# Patient Record
Sex: FEMALE | Race: WHITE | NOT HISPANIC OR LATINO | Employment: OTHER | URBAN - METROPOLITAN AREA
[De-identification: names, ages, dates, MRNs, and addresses within clinical notes are randomized per-mention and may not be internally consistent; named-entity substitution may affect disease eponyms.]

---

## 2018-04-04 ENCOUNTER — HISTORICAL (OUTPATIENT)
Dept: RADIOLOGY | Facility: HOSPITAL | Age: 78
End: 2018-04-04

## 2018-04-04 LAB — POC CREATININE: 0.5 MG/DL (ref 0.6–1.3)

## 2021-12-21 ENCOUNTER — HISTORICAL (OUTPATIENT)
Dept: RADIOLOGY | Facility: HOSPITAL | Age: 81
End: 2021-12-21

## 2021-12-21 LAB — POC CREATININE: 0.7 MG/DL (ref 0.6–1.3)

## 2022-09-08 RX ORDER — PREGABALIN 75 MG/1
75 CAPSULE ORAL DAILY
Qty: 30 CAPSULE | Refills: 1 | OUTPATIENT
Start: 2022-09-08

## 2022-09-08 RX ORDER — PREGABALIN 75 MG/1
75 CAPSULE ORAL
COMMUNITY
Start: 2021-11-02

## 2022-09-19 ENCOUNTER — OFFICE VISIT (OUTPATIENT)
Dept: HEMATOLOGY/ONCOLOGY | Facility: CLINIC | Age: 82
End: 2022-09-19
Payer: COMMERCIAL

## 2022-09-19 VITALS
SYSTOLIC BLOOD PRESSURE: 116 MMHG | OXYGEN SATURATION: 98 % | TEMPERATURE: 98 F | BODY MASS INDEX: 28.87 KG/M2 | RESPIRATION RATE: 18 BRPM | WEIGHT: 152.88 LBS | DIASTOLIC BLOOD PRESSURE: 65 MMHG | HEIGHT: 61 IN | HEART RATE: 91 BPM

## 2022-09-19 DIAGNOSIS — Z17.0 MALIGNANT NEOPLASM OF RIGHT BREAST IN FEMALE, ESTROGEN RECEPTOR POSITIVE, UNSPECIFIED SITE OF BREAST: Primary | ICD-10-CM

## 2022-09-19 DIAGNOSIS — C50.911 MALIGNANT NEOPLASM OF RIGHT BREAST IN FEMALE, ESTROGEN RECEPTOR POSITIVE, UNSPECIFIED SITE OF BREAST: Primary | ICD-10-CM

## 2022-09-19 PROCEDURE — 3078F PR MOST RECENT DIASTOLIC BLOOD PRESSURE < 80 MM HG: ICD-10-PCS | Mod: CPTII,,, | Performed by: INTERNAL MEDICINE

## 2022-09-19 PROCEDURE — 1159F PR MEDICATION LIST DOCUMENTED IN MEDICAL RECORD: ICD-10-PCS | Mod: CPTII,,, | Performed by: INTERNAL MEDICINE

## 2022-09-19 PROCEDURE — 3078F DIAST BP <80 MM HG: CPT | Mod: CPTII,,, | Performed by: INTERNAL MEDICINE

## 2022-09-19 PROCEDURE — 1101F PT FALLS ASSESS-DOCD LE1/YR: CPT | Mod: CPTII,,, | Performed by: INTERNAL MEDICINE

## 2022-09-19 PROCEDURE — 1125F AMNT PAIN NOTED PAIN PRSNT: CPT | Mod: CPTII,,, | Performed by: INTERNAL MEDICINE

## 2022-09-19 PROCEDURE — 3074F PR MOST RECENT SYSTOLIC BLOOD PRESSURE < 130 MM HG: ICD-10-PCS | Mod: CPTII,,, | Performed by: INTERNAL MEDICINE

## 2022-09-19 PROCEDURE — 99214 OFFICE O/P EST MOD 30 MIN: CPT | Mod: ,,, | Performed by: INTERNAL MEDICINE

## 2022-09-19 PROCEDURE — 3288F PR FALLS RISK ASSESSMENT DOCUMENTED: ICD-10-PCS | Mod: CPTII,,, | Performed by: INTERNAL MEDICINE

## 2022-09-19 PROCEDURE — 99214 PR OFFICE/OUTPT VISIT, EST, LEVL IV, 30-39 MIN: ICD-10-PCS | Mod: ,,, | Performed by: INTERNAL MEDICINE

## 2022-09-19 PROCEDURE — 1101F PR PT FALLS ASSESS DOC 0-1 FALLS W/OUT INJ PAST YR: ICD-10-PCS | Mod: CPTII,,, | Performed by: INTERNAL MEDICINE

## 2022-09-19 PROCEDURE — 1125F PR PAIN SEVERITY QUANTIFIED, PAIN PRESENT: ICD-10-PCS | Mod: CPTII,,, | Performed by: INTERNAL MEDICINE

## 2022-09-19 PROCEDURE — 1159F MED LIST DOCD IN RCRD: CPT | Mod: CPTII,,, | Performed by: INTERNAL MEDICINE

## 2022-09-19 PROCEDURE — 3288F FALL RISK ASSESSMENT DOCD: CPT | Mod: CPTII,,, | Performed by: INTERNAL MEDICINE

## 2022-09-19 PROCEDURE — 3074F SYST BP LT 130 MM HG: CPT | Mod: CPTII,,, | Performed by: INTERNAL MEDICINE

## 2022-09-19 RX ORDER — EXEMESTANE 25 MG/1
25 TABLET ORAL DAILY
Qty: 90 TABLET | Refills: 3 | Status: SHIPPED | OUTPATIENT
Start: 2022-09-19 | End: 2023-10-02

## 2022-09-19 RX ORDER — ROSUVASTATIN CALCIUM 20 MG/1
20 TABLET, COATED ORAL DAILY
COMMUNITY

## 2022-09-19 RX ORDER — FLUTICASONE PROPIONATE AND SALMETEROL 100; 50 UG/1; UG/1
1 POWDER RESPIRATORY (INHALATION) 2 TIMES DAILY
COMMUNITY

## 2022-09-19 RX ORDER — CARVEDILOL 12.5 MG/1
12.5 TABLET ORAL 2 TIMES DAILY WITH MEALS
COMMUNITY

## 2022-09-19 RX ORDER — ONDANSETRON HYDROCHLORIDE 8 MG/1
TABLET, FILM COATED ORAL EVERY 8 HOURS PRN
COMMUNITY

## 2022-09-19 RX ORDER — NITROGLYCERIN 80 MG/1
1 PATCH TRANSDERMAL
COMMUNITY

## 2022-09-19 RX ORDER — FEXOFENADINE HCL AND PSEUDOEPHEDRINE HCI 180; 240 MG/1; MG/1
1 TABLET, EXTENDED RELEASE ORAL DAILY
COMMUNITY

## 2022-09-19 RX ORDER — FUROSEMIDE 20 MG/1
20 TABLET ORAL 2 TIMES DAILY
COMMUNITY

## 2022-09-19 RX ORDER — OMEPRAZOLE 40 MG/1
40 CAPSULE, DELAYED RELEASE ORAL DAILY
COMMUNITY

## 2022-09-19 RX ORDER — DEXTROMETHORPHAN HYDROBROMIDE, GUAIFENESIN 5; 100 MG/5ML; MG/5ML
650 LIQUID ORAL EVERY 8 HOURS
COMMUNITY

## 2022-09-19 RX ORDER — ASPIRIN 81 MG/1
81 TABLET ORAL DAILY
COMMUNITY

## 2022-09-19 RX ORDER — EXEMESTANE 25 MG/1
25 TABLET ORAL DAILY
COMMUNITY
End: 2023-10-02

## 2022-09-19 RX ORDER — RANOLAZINE 500 MG/1
500 TABLET, EXTENDED RELEASE ORAL 2 TIMES DAILY
COMMUNITY
Start: 2022-09-09

## 2022-09-19 NOTE — PROGRESS NOTES
DATE:  09/19/2022    PROBLEM:  Stage IIIC ER 95%, VT 98% and HER 2 negative UOQ right breast with positive axillary LN on biopsy. Received neoadjuvant chemotherapy. Post mastectomy stage [id number] with 19 of 20 + LN and a 4.5 cm primary..     HxPI:  83 yo female who went for a routine mammogram that was abnormal and they were subsequently able to palpate a mass in her breast.   Prior mammogram was 6/14/2016 read as benign.  11/29/17 Breast U/S revealed a 4.2 x 2.3 cm mass at 10:00.  A 12/22/17 core biopsy revealed invasive ductal carcinoma with lobular features, grade 2, No LVI and focal DCIS, ER 95%, VT 98% and HER 2 0; Ki67=7%  MRI breast center [facility] 12/28/17 revealed right breast 9:00 mass 4.1 x 1.5, 2.4 cm. There are numerous enlarged LN in the right axilla extending into the right axillary tail inferiorly and extending to the pectoralis minor muscle posteriorly and superiorly; highly suspicious.   On 1/11/18 she had a Mediport placed and biopsy of axillary LN that was negative.  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 CT abdomen no mets;  She initiated neoadjuvant ddAC x 4 followed by dd Taxol x 4 from 1/30/18 to 5/15/18. At mastectomy [id number]: she had a residual primary measuring 4.5 cm with 19 of 20 + LN with no treatment effect.   She started radiation 8/8/18 completed 9/19/18. She has been on arimidex since 10/2018. She has been on Prolia since 11/2018 received 2nd dose 5/2019 and then had interruption until 9/202 when she resumed  9/9/20 she was changed from arimidex to femara due to severe arthritic pains. Femara stopped 3/2021 due to increased arthralgias.  Current Treatment: observation    Treatment History:   neoadjuvant dd AC x 4 followed by ddTaxol x 4 cycles 1/30/18 to 3/15/18; dd Taxol initiated 4/3/18 to 5/15/18  Mastectomy 6/25/18  EBRT 8/8/18- 9/19/18  Arimidex 10/20/18- 9/9/20  (stopped due to increased arthralgias)  Prolia q 6 months (started 2018)  Femara 20- 3/15/21 (currently on hold due to arthralgias.    INTERVAL Hx::  2022    LABS:    3/21/22 CBC and CMP WNL    IMAGIN18 MRI: axillary adenopathy decreased from 2.1 to 1.2 cm; breast mass decreased from 4.1 to 3.5 cm  18 CT chest: resolved tiny LLL nodule; stable RML density (patient attributes to previous PE)  10/8/18: DEXA- osteoporosis in and right femoral head; osteopenia left femoral head  10/16/18 CT c/a/p- stable 2mm LLL nodule, post mastectomy fluid collection RCW  11/15/18: MMG left best- benign  19 CT Chest parenchymal opacity to right apex and peripheral right middle lobe, these areas do not have masslike appearance and are attributed to post radiation changes; left lower lobe nodule remains stable; right sided post operative seroma has increased in size  19 CT chest: decreased seroma, LLL nodule stable, right apical opacity stable  20 MMG screening left: no evidence of malignancy  20 US right breast fluid collection 8,5x2,6x0,4cm suggestive of seroma or lymphocele. Complex mass including prominent complex cyst with irregular wall thickness anterior aspect of residual right breast tissue  10/14/20 DEXA: T -.2.6 L spine, -2.7 left femur, -3.2 right femur  21 MMG screening: BIRADS2 benign   21MRI brain w/ and w/o contrast: Evolving right occipital hemorrhage has decreased in size with less edema. Consider follow-up brain MRI with and without contrast in 3-6 months to document expected evolutionary changes/resolution of enhancement, unless clinically warranted sooner  22 MMG screening: BIRADS2     ROS:  CONSTITUTIONAL: no fevers, no chills, no weight loss, no fatigue, no weakness  HEMATOLOGIC: no abnormal bleeding, no abnormal bruising, no drenching night sweats  ONCOLOGIC: no new masses or lumps  HEENT: no vision loss, no tinnitus or hearing loss, no nose bleeding, no  "dysphagia, no odynophagia  CVS: no chest pain, no palpitations, no dyspnea on exertion  RESP: no shortness of breath, no hemoptysis, no cough  BREAST: no nipple discharge, no breast tenderness, no breast masses on self breast examination, s/p right mastectomy  GI: no nausea, no vomiting, no diarrhea, no constipation, no melena, no hematochezia, no hematemesis, no abdominal pain, no increase in abdominal girth  : no dysuria, no hematuria, no discharge  GYN: no abnormal vaginal bleeding, no dyspareunia, no vaginal discharge  INTEGUMENT: no rashes, no abnormal bruising, no nail pitting, no hyperpigmentation  NEURO: no falls, no memory loss, + neuropathy LLE (chronic), no urofecal incontinence or retention, no loss of strength on any extremity  MSK: no back pain, no joint pain, no joint swelling  PSYCH: no suicidal or homicidal ideation, no depression, no insomnia, no anhedonia  ENDOCRINE: no heat or cold intolerance, no polyuria, no polydipsia     OBSERVATIONS:  SKIN:norashes,nobruisesorpurpura,warm/dry  NEURO:normalmentation,strength5/2cupsy6uicdokbxmlx,nosensorydeficits" dd:footnoteid="_[id number]"GA: AAOx3, NAD  HEENT: NCAT, PERRLA, EOMI, no oral ulcers  LYMPH: no cervical, axillary, or supraclavicular adenopathy  CVS: s1s2 RRR, no M/R/G  RESP: CTA b/l, no crackles, no wheezes or rhonchi  Breast: Left: no masses, lumps, no nipple inversion, no peau d'orange, Right: fibrotic scar tissue around mastectomy site  ABD: Soft, NT, ND, BS+, no hepatosplenomegaly  EXT: POPE no deformities, no pedal edema but asymmetry with LLE calf diameter > RLE  SKIN: no rashes, no bruises or purpura, warm and dry  NEURO: normal mentation, strength 5/5 on all 4 extremities, no sensory deficits.     ASSESSMENT:  1. Malignant neoplasm of upper-outer quadrant of right female breast C50.411 stage IIIC RUQ IDC grade 2 HR+/HER2- s/p NA chemo with minimal to no pathologic response, s/p right mastectomy 6/2018. Completed " EBRT 9/19/18. Arimidex initiated 10/20/18 and stopped 9/9/20 due to severe arthralgias  On Femara since 9/9/20 with same symptoms causing limitations in ADLs and quality of life, Femara has been on hold since 3/2021.Now on aromasin.  2. Osteoporosis M81.0 DEXA 10/8/18 revealed osteoporosis. Repeat DEXA 10/2020 showed multifocal osteoporosis as well. Next DEXA due 10/2022  She received 2 doses of prolia 11/2018 and 5/2019 then had interruption until 9/2020 at which point Prolia resumed.  c/w q6 months Prolia. Last dose 9/28/21  Continue Vitamin D + Calcium for bone health.    PLAN:  Continue antihormone treatment in the form of Examestane (Aromasin) until the 5 year marisa.  Needs refill.  Due for Prolia.  With change in insurance, we will have the put in another request.  Left breast mammography will be due in January.  Next follow-up recheck in 6 months.  CBC and CMP on RTC.    JEFF WHEELER M.D., FACP

## 2022-10-12 DIAGNOSIS — M81.0 OSTEOPOROSIS, UNSPECIFIED OSTEOPOROSIS TYPE, UNSPECIFIED PATHOLOGICAL FRACTURE PRESENCE: ICD-10-CM

## 2023-03-20 ENCOUNTER — OFFICE VISIT (OUTPATIENT)
Dept: HEMATOLOGY/ONCOLOGY | Facility: CLINIC | Age: 83
End: 2023-03-20
Payer: MEDICARE

## 2023-03-20 VITALS
HEIGHT: 61 IN | OXYGEN SATURATION: 96 % | BODY MASS INDEX: 28.55 KG/M2 | RESPIRATION RATE: 20 BRPM | DIASTOLIC BLOOD PRESSURE: 92 MMHG | HEART RATE: 74 BPM | TEMPERATURE: 98 F | SYSTOLIC BLOOD PRESSURE: 146 MMHG | WEIGHT: 151.19 LBS

## 2023-03-20 DIAGNOSIS — M81.0 OSTEOPOROSIS, UNSPECIFIED OSTEOPOROSIS TYPE, UNSPECIFIED PATHOLOGICAL FRACTURE PRESENCE: ICD-10-CM

## 2023-03-20 DIAGNOSIS — Z17.0 MALIGNANT NEOPLASM OF RIGHT BREAST IN FEMALE, ESTROGEN RECEPTOR POSITIVE, UNSPECIFIED SITE OF BREAST: Primary | ICD-10-CM

## 2023-03-20 DIAGNOSIS — C50.911 MALIGNANT NEOPLASM OF RIGHT BREAST IN FEMALE, ESTROGEN RECEPTOR POSITIVE, UNSPECIFIED SITE OF BREAST: Primary | ICD-10-CM

## 2023-03-20 PROCEDURE — 3080F DIAST BP >= 90 MM HG: CPT | Mod: CPTII,,, | Performed by: NURSE PRACTITIONER

## 2023-03-20 PROCEDURE — 3288F FALL RISK ASSESSMENT DOCD: CPT | Mod: CPTII,,, | Performed by: NURSE PRACTITIONER

## 2023-03-20 PROCEDURE — 1125F AMNT PAIN NOTED PAIN PRSNT: CPT | Mod: CPTII,,, | Performed by: NURSE PRACTITIONER

## 2023-03-20 PROCEDURE — 3288F PR FALLS RISK ASSESSMENT DOCUMENTED: ICD-10-PCS | Mod: CPTII,,, | Performed by: NURSE PRACTITIONER

## 2023-03-20 PROCEDURE — 1159F MED LIST DOCD IN RCRD: CPT | Mod: CPTII,,, | Performed by: NURSE PRACTITIONER

## 2023-03-20 PROCEDURE — 1101F PT FALLS ASSESS-DOCD LE1/YR: CPT | Mod: CPTII,,, | Performed by: NURSE PRACTITIONER

## 2023-03-20 PROCEDURE — 1160F PR REVIEW ALL MEDS BY PRESCRIBER/CLIN PHARMACIST DOCUMENTED: ICD-10-PCS | Mod: CPTII,,, | Performed by: NURSE PRACTITIONER

## 2023-03-20 PROCEDURE — 1125F PR PAIN SEVERITY QUANTIFIED, PAIN PRESENT: ICD-10-PCS | Mod: CPTII,,, | Performed by: NURSE PRACTITIONER

## 2023-03-20 PROCEDURE — 3077F SYST BP >= 140 MM HG: CPT | Mod: CPTII,,, | Performed by: NURSE PRACTITIONER

## 2023-03-20 PROCEDURE — 1101F PR PT FALLS ASSESS DOC 0-1 FALLS W/OUT INJ PAST YR: ICD-10-PCS | Mod: CPTII,,, | Performed by: NURSE PRACTITIONER

## 2023-03-20 PROCEDURE — 1159F PR MEDICATION LIST DOCUMENTED IN MEDICAL RECORD: ICD-10-PCS | Mod: CPTII,,, | Performed by: NURSE PRACTITIONER

## 2023-03-20 PROCEDURE — 3080F PR MOST RECENT DIASTOLIC BLOOD PRESSURE >= 90 MM HG: ICD-10-PCS | Mod: CPTII,,, | Performed by: NURSE PRACTITIONER

## 2023-03-20 PROCEDURE — 3077F PR MOST RECENT SYSTOLIC BLOOD PRESSURE >= 140 MM HG: ICD-10-PCS | Mod: CPTII,,, | Performed by: NURSE PRACTITIONER

## 2023-03-20 PROCEDURE — 99214 PR OFFICE/OUTPT VISIT, EST, LEVL IV, 30-39 MIN: ICD-10-PCS | Mod: ,,, | Performed by: NURSE PRACTITIONER

## 2023-03-20 PROCEDURE — 1160F RVW MEDS BY RX/DR IN RCRD: CPT | Mod: CPTII,,, | Performed by: NURSE PRACTITIONER

## 2023-03-20 PROCEDURE — 99214 OFFICE O/P EST MOD 30 MIN: CPT | Mod: ,,, | Performed by: NURSE PRACTITIONER

## 2023-03-20 NOTE — PROGRESS NOTES
DATE:  03/20/2023    PROBLEM:  Stage IIIC ER 95%, LA 98% and HER 2 negative UOQ right breast with positive axillary LN on biopsy. Received neoadjuvant chemotherapy. Post mastectomy stage [id number] with 19 of 20 + LN and a 4.5 cm primary..     HxPI:  83 yo female who went for a routine mammogram that was abnormal and they were subsequently able to palpate a mass in her breast.   Prior mammogram was 6/14/2016 read as benign.  11/29/17 Breast U/S revealed a 4.2 x 2.3 cm mass at 10:00.  A 12/22/17 core biopsy revealed invasive ductal carcinoma with lobular features, grade 2, No LVI and focal DCIS, ER 95%, LA 98% and HER 2 0; Ki67=7%  MRI breast center [facility] 12/28/17 revealed right breast 9:00 mass 4.1 x 1.5, 2.4 cm. There are numerous enlarged LN in the right axilla extending into the right axillary tail inferiorly and extending to the pectoralis minor muscle posteriorly and superiorly; highly suspicious.   On 1/11/18 she had a Mediport placed and biopsy of axillary LN that was negative.  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 CT abdomen no mets;  She initiated neoadjuvant ddAC x 4 followed by dd Taxol x 4 from 1/30/18 to 5/15/18. At mastectomy [id number]: she had a residual primary measuring 4.5 cm with 19 of 20 + LN with no treatment effect.   She started radiation 8/8/18 completed 9/19/18. She has been on arimidex since 10/2018. She has been on Prolia since 11/2018 received 2nd dose 5/2019 and then had interruption until 9/202 when she resumed  9/9/20 she was changed from arimidex to femara due to severe arthritic pains. Femara stopped 3/2021 due to increased arthralgias.  Current Treatment: observation    Treatment History:   neoadjuvant dd AC x 4 followed by ddTaxol x 4 cycles 1/30/18 to 3/15/18; dd Taxol initiated 4/3/18 to 5/15/18  Mastectomy 6/25/18  EBRT 8/8/18- 9/19/18  Arimidex 10/20/18- 9/9/20  (stopped due to increased arthralgias)  Prolia q 6 months (started 2018)  Femara 20- 3/15/21 (currently on hold due to arthralgias.    INTERVAL Hx::  3/20/23 Patient presents for 6 month follow-up breast cancer, She is taking Examestane is compliant and tolerating medication. She denies hotflashes, joint pains, muscle aches. Denies bleeding, breast complaints. Denies n/v/d/c, fevers, headaches, chills. She has no new complaints or concerns     LABS:    3/21/22 CBC and CMP WNL  3/20/2023 CBC/CMP WNL     IMAGIN18 MRI: axillary adenopathy decreased from 2.1 to 1.2 cm; breast mass decreased from 4.1 to 3.5 cm  18 CT chest: resolved tiny LLL nodule; stable RML density (patient attributes to previous PE)  10/8/18: DEXA- osteoporosis in and right femoral head; osteopenia left femoral head  10/16/18 CT c/a/p- stable 2mm LLL nodule, post mastectomy fluid collection RCW  11/15/18: MMG left best- benign  19 CT Chest parenchymal opacity to right apex and peripheral right middle lobe, these areas do not have masslike appearance and are attributed to post radiation changes; left lower lobe nodule remains stable; right sided post operative seroma has increased in size  19 CT chest: decreased seroma, LLL nodule stable, right apical opacity stable  20 MMG screening left: no evidence of malignancy  20 US right breast fluid collection 8,5x2,6x0,4cm suggestive of seroma or lymphocele. Complex mass including prominent complex cyst with irregular wall thickness anterior aspect of residual right breast tissue  10/14/20 DEXA: T -.2.6 L spine, -2.7 left femur, -3.2 right femur  21 MMG screening: BIRADS2 benign   21MRI brain w/ and w/o contrast: Evolving right occipital hemorrhage has decreased in size with less edema. Consider follow-up brain MRI with and without contrast in 3-6 months to document expected evolutionary changes/resolution of enhancement, unless clinically warranted  "sooner  1/18/22 MMG screening: BIRADS2   2/17/2023 DEXA: T -1.3 L spine, -2.5  left femur, -2.9 right femur   2/8/2023 MMG screening left breast No mammographic findings suspicious for malignancy     ROS:  CONSTITUTIONAL: no fevers, no chills, no weight loss, no fatigue, no weakness  HEMATOLOGIC: no abnormal bleeding, no abnormal bruising, no drenching night sweats  ONCOLOGIC: no new masses or lumps  HEENT: no vision loss, no tinnitus or hearing loss, no nose bleeding, no dysphagia, no odynophagia  CVS: no chest pain, no palpitations, no dyspnea on exertion  RESP: no shortness of breath, no hemoptysis, no cough  BREAST: no nipple discharge, no breast tenderness, no breast masses on self breast examination, s/p right mastectomy  GI: no nausea, no vomiting, no diarrhea, no constipation, no melena, no hematochezia, no hematemesis, no abdominal pain, no increase in abdominal girth  : no dysuria, no hematuria, no discharge  GYN: no abnormal vaginal bleeding, no dyspareunia, no vaginal discharge  INTEGUMENT: no rashes, no abnormal bruising, no nail pitting, no hyperpigmentation  NEURO: no falls, no memory loss, + neuropathy LLE (chronic), no urofecal incontinence or retention, no loss of strength on any extremity  MSK: no back pain, no joint pain, no joint swelling  PSYCH: no suicidal or homicidal ideation, no depression, no insomnia, no anhedonia  ENDOCRINE: no heat or cold intolerance, no polyuria, no polydipsia     OBSERVATIONS:  SKIN:norashes,nobruisesorpurpura,warm/dry  NEURO:normalmentation,strength5/0gyrky9nqkfrsrsqns,nosensorydeficits" dd:[id number]"GA: AAOx3, NAD  HEENT: NCAT, PERRLA, EOMI, no oral ulcers  LYMPH: no cervical, axillary, or supraclavicular adenopathy  CVS: s1s2 RRR, no M/R/G  RESP: CTA b/l, no crackles, no wheezes or rhonchi  Breast: Left: no masses, lumps, no nipple inversion, no peau d'orange, Right: fibrotic scar tissue around mastectomy site  ABD: Soft, " NT, ND, BS+, no hepatosplenomegaly  EXT: POPE no deformities, no pedal edema but asymmetry with LLE calf diameter > RLE  SKIN: no rashes, no bruises or purpura, warm and dry  NEURO: normal mentation, strength 5/5 on all 4 extremities, no sensory deficits.     ASSESSMENT:  1. Malignant neoplasm of upper-outer quadrant of right female breast C50.411 stage IIIC RUQ IDC grade 2 HR+/HER2- s/p NA chemo with minimal to no pathologic response, s/p right mastectomy 6/2018. Completed EBRT 9/19/18. Arimidex initiated 10/20/18 and stopped 9/9/20 due to severe arthralgias  On Femara since 9/9/20 with same symptoms causing limitations in ADLs and quality of life, Femara has been on hold since 3/2021.Now on aromasin.  2. Osteoporosis M81.0 DEXA 10/8/18 revealed osteoporosis. Repeat DEXA 10/2020 showed multifocal osteoporosis as well. Next DEXA due 10/2022  She received 2 doses of prolia 11/2018 and 5/2019 then had interruption until 9/2020 at which point Prolia resumed.  c/w q6 months Prolia. Last dose 9/28/21  Continue Vitamin D + Calcium for bone health.    PLAN:  Continue antihormone treatment in the form of Examestane (Aromasin) until the 5 year marisa, will discuss at next visit when to discontinue   Prolia due 5/2023    RTC in 6 months       KENNEDY Ragland

## 2023-09-22 DIAGNOSIS — Z17.0 MALIGNANT NEOPLASM OF RIGHT BREAST IN FEMALE, ESTROGEN RECEPTOR POSITIVE, UNSPECIFIED SITE OF BREAST: ICD-10-CM

## 2023-09-22 DIAGNOSIS — C50.911 MALIGNANT NEOPLASM OF RIGHT BREAST IN FEMALE, ESTROGEN RECEPTOR POSITIVE, UNSPECIFIED SITE OF BREAST: ICD-10-CM

## 2023-09-29 ENCOUNTER — OFFICE VISIT (OUTPATIENT)
Dept: HEMATOLOGY/ONCOLOGY | Facility: CLINIC | Age: 83
End: 2023-09-29
Payer: MEDICARE

## 2023-09-29 VITALS
BODY MASS INDEX: 30.06 KG/M2 | OXYGEN SATURATION: 96 % | SYSTOLIC BLOOD PRESSURE: 111 MMHG | TEMPERATURE: 98 F | HEART RATE: 72 BPM | HEIGHT: 61 IN | DIASTOLIC BLOOD PRESSURE: 72 MMHG | WEIGHT: 159.19 LBS | RESPIRATION RATE: 18 BRPM

## 2023-09-29 DIAGNOSIS — C50.911 MALIGNANT NEOPLASM OF RIGHT BREAST IN FEMALE, ESTROGEN RECEPTOR POSITIVE, UNSPECIFIED SITE OF BREAST: ICD-10-CM

## 2023-09-29 DIAGNOSIS — Z17.0 MALIGNANT NEOPLASM OF UPPER-OUTER QUADRANT OF RIGHT BREAST IN FEMALE, ESTROGEN RECEPTOR POSITIVE: ICD-10-CM

## 2023-09-29 DIAGNOSIS — Z17.0 MALIGNANT NEOPLASM OF RIGHT BREAST IN FEMALE, ESTROGEN RECEPTOR POSITIVE, UNSPECIFIED SITE OF BREAST: ICD-10-CM

## 2023-09-29 DIAGNOSIS — C50.411 MALIGNANT NEOPLASM OF UPPER-OUTER QUADRANT OF RIGHT BREAST IN FEMALE, ESTROGEN RECEPTOR POSITIVE: ICD-10-CM

## 2023-09-29 PROCEDURE — 3288F FALL RISK ASSESSMENT DOCD: CPT | Mod: CPTII,,, | Performed by: NURSE PRACTITIONER

## 2023-09-29 PROCEDURE — 1160F RVW MEDS BY RX/DR IN RCRD: CPT | Mod: CPTII,,, | Performed by: NURSE PRACTITIONER

## 2023-09-29 PROCEDURE — 1160F PR REVIEW ALL MEDS BY PRESCRIBER/CLIN PHARMACIST DOCUMENTED: ICD-10-PCS | Mod: CPTII,,, | Performed by: NURSE PRACTITIONER

## 2023-09-29 PROCEDURE — 1159F MED LIST DOCD IN RCRD: CPT | Mod: CPTII,,, | Performed by: NURSE PRACTITIONER

## 2023-09-29 PROCEDURE — 3078F DIAST BP <80 MM HG: CPT | Mod: CPTII,,, | Performed by: NURSE PRACTITIONER

## 2023-09-29 PROCEDURE — 1159F PR MEDICATION LIST DOCUMENTED IN MEDICAL RECORD: ICD-10-PCS | Mod: CPTII,,, | Performed by: NURSE PRACTITIONER

## 2023-09-29 PROCEDURE — 99214 OFFICE O/P EST MOD 30 MIN: CPT | Mod: ,,, | Performed by: NURSE PRACTITIONER

## 2023-09-29 PROCEDURE — 1101F PR PT FALLS ASSESS DOC 0-1 FALLS W/OUT INJ PAST YR: ICD-10-PCS | Mod: CPTII,,, | Performed by: NURSE PRACTITIONER

## 2023-09-29 PROCEDURE — 3288F PR FALLS RISK ASSESSMENT DOCUMENTED: ICD-10-PCS | Mod: CPTII,,, | Performed by: NURSE PRACTITIONER

## 2023-09-29 PROCEDURE — 99214 PR OFFICE/OUTPT VISIT, EST, LEVL IV, 30-39 MIN: ICD-10-PCS | Mod: ,,, | Performed by: NURSE PRACTITIONER

## 2023-09-29 PROCEDURE — 1126F AMNT PAIN NOTED NONE PRSNT: CPT | Mod: CPTII,,, | Performed by: NURSE PRACTITIONER

## 2023-09-29 PROCEDURE — 1126F PR PAIN SEVERITY QUANTIFIED, NO PAIN PRESENT: ICD-10-PCS | Mod: CPTII,,, | Performed by: NURSE PRACTITIONER

## 2023-09-29 PROCEDURE — 1101F PT FALLS ASSESS-DOCD LE1/YR: CPT | Mod: CPTII,,, | Performed by: NURSE PRACTITIONER

## 2023-09-29 PROCEDURE — 3074F PR MOST RECENT SYSTOLIC BLOOD PRESSURE < 130 MM HG: ICD-10-PCS | Mod: CPTII,,, | Performed by: NURSE PRACTITIONER

## 2023-09-29 PROCEDURE — 3078F PR MOST RECENT DIASTOLIC BLOOD PRESSURE < 80 MM HG: ICD-10-PCS | Mod: CPTII,,, | Performed by: NURSE PRACTITIONER

## 2023-09-29 PROCEDURE — 3074F SYST BP LT 130 MM HG: CPT | Mod: CPTII,,, | Performed by: NURSE PRACTITIONER

## 2023-09-29 NOTE — PROGRESS NOTES
DATE:  03/20/2023    PROBLEM:  Stage IIIC ER 95%, MS 98% and HER 2 negative UOQ right breast with positive axillary LN on biopsy. Received neoadjuvant chemotherapy. Post mastectomy stage [id number] with 19 of 20 + LN and a 4.5 cm primary..     HxPI:  81 yo female who went for a routine mammogram that was abnormal and they were subsequently able to palpate a mass in her breast.   Prior mammogram was 6/14/2016 read as benign.  11/29/17 Breast U/S revealed a 4.2 x 2.3 cm mass at 10:00.  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 There are numerous enlarged LN in the right axilla extending into the right axillary tail inferiorly and extending to the pectoralis minor muscle posteriorly and superiorly; highly suspicious.   On 1/11/18 she had a Mediport placed and biopsy of axillary LN that was negative.  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 CT abdomen no mets;  She initiated neoadjuvant ddAC x 4 followed by dd Taxol x 4 from 1/30/18 to 5/15/18. At mastectomy [id number]: she had a residual primary measuring 4.5 cm with 19 of 20 + LN with no treatment effect.   She started radiation 8/8/18 completed 9/19/18. She has been on arimidex since 10/2018. She has been on Prolia since 11/2018 received 2nd dose 5/2019 and then had interruption until 9/202 when she resumed  9/9/20 she was changed from arimidex to femara due to severe arthritic pains. Femara stopped 3/2021 due to increased arthralgias.  Current Treatment: observation    Treatment History:   neoadjuvant dd AC x 4 followed by ddTaxol x 4 cycles 1/30/18 to 3/15/18; dd Taxol initiated 4/3/18 to 5/15/18  Mastectomy 6/25/18  EBRT 8/8/18- 9/19/18  Arimidex 10/20/18- 9/9/20  (stopped due to increased arthralgias)  Prolia q 6 months (started 2018)  Femara 20- 3/15/21 (currently on hold due to arthralgias.    INTERVAL Hx::    2023:  Ms. Howard is here today with her granddaughter for her follow-up regarding Stage IIIC ER/DE positive and HER 2 negative.  She reports good compliance with Examestane, which she restarted 1 year ago per her report.  She continues on Vitamin D and calcium, and receives Prolia Q 6 months for osteoporosis.  She reports no new lumps, masses, nipple discharge, or skin changes left breast.  No changes on right breast surgical incision or chest wall.  She denies any new onset of headaches, cough, SOB, chest pain, abdominal pain, or bone pain. She denies any joint pain, depression, hot flashes, vaginal dryness, fatigue, headaches, insomnia.  Labs reviewed with patient dated 2023:  Creatinine 0.77, liver enzymes WNL, WBC 5.86, Hgb 13.2, Hct 41.6, and plt 179,000.  Weight is stable.  Eating and drinking.  No recent hospitalizations, illnesses, or infections.        LABS:  2023:  Creatinine 0.77, liver enzymes WNL, WBC 5.86, Hgb 13.2, Hct 41.6, and plt 179,000  3/21/22 CBC and CMP WNL  3/20/2023 CBC/CMP WNL     IMAGIN18 MRI: axillary adenopathy decreased from 2.1 to 1.2 cm; breast mass decreased from 4.1 to 3.5 cm  18 CT chest: resolved tiny LLL nodule; stable RML density (patient attributes to previous PE)  10/8/18: DEXA- osteoporosis in and right femoral head; osteopenia left femoral head  10/16/18 CT c/a/p- stable 2mm LLL nodule, post mastectomy fluid collection RCW  11/15/18: MMG left best- benign  19 CT Chest parenchymal opacity to right apex and peripheral right middle lobe, these areas do not have masslike appearance and are attributed to post radiation changes; left lower lobe nodule remains stable; right sided post operative seroma has increased in size  19 CT chest: decreased seroma, LLL nodule stable, right apical  opacity stable  1/8/20 MMG screening left: no evidence of malignancy  9/30/20 US right breast fluid collection 8,5x2,6x0,4cm suggestive of seroma or lymphocele. Complex mass including prominent complex cyst with irregular wall thickness anterior aspect of residual right breast tissue  10/14/20 DEXA: T -.2.6 L spine, -2.7 left femur, -3.2 right femur  1/11/21 MMG screening: BIRADS2 benign   12/21/21MRI brain w/ and w/o contrast: Evolving right occipital hemorrhage has decreased in size with less edema. Consider follow-up brain MRI with and without contrast in 3-6 months to document expected evolutionary changes/resolution of enhancement, unless clinically warranted sooner  1/18/22 MMG screening: BIRADS2   2/17/2023 DEXA: T -1.3 L spine, -2.5  left femur, -2.9 right femur   2/8/2023 MMG screening left breast No mammographic findings suspicious for malignancy   2/27/2023:  Nuclear Whole-Body Bone Scan 2/27/2023:  Scattered degenerative/arthritic activity of the axial and appendicular skeleton.  No evidence of skeletal metastatic disease.    ROS:  CONSTITUTIONAL: no fevers, no chills, no weight loss, no fatigue, no weakness  HEMATOLOGIC: no abnormal bleeding, no abnormal bruising, no drenching night sweats  ONCOLOGIC: no new masses or lumps  HEENT: no vision loss, no tinnitus or hearing loss, no nose bleeding, no dysphagia, no odynophagia  CVS: no chest pain, no palpitations, no dyspnea on exertion  RESP: no shortness of breath, no hemoptysis, no cough  BREAST: no nipple discharge, no breast tenderness, no breast masses on self breast examination, s/p right mastectomy  GI: no nausea, no vomiting, no diarrhea, no constipation, no melena, no hematochezia, no hematemesis, no abdominal pain, no increase in abdominal girth  : no dysuria, no hematuria, no discharge  GYN: no abnormal vaginal bleeding, no dyspareunia, no vaginal discharge  INTEGUMENT: no rashes, no abnormal bruising, no nail pitting, no  "hyperpigmentation  NEURO: no falls, no memory loss, + neuropathy LLE (chronic), no urofecal incontinence or retention, no loss of strength on any extremity  MSK: no back pain, no joint pain, no joint swelling  PSYCH: no suicidal or homicidal ideation, no depression, no insomnia, no anhedonia  ENDOCRINE: no heat or cold intolerance, no polyuria, no polydipsia     OBSERVATIONS:  SKIN:norashes,nobruisesorpurpura,warm/dry  NEURO:normalmentation,strength5/9vseoo4uxsxibjfcbf,nosensorydeficits" dd:[id number]"GA: AAOx3, NAD  HEENT: NCAT, PERRLA, EOMI, no oral ulcers  LYMPH: no cervical, axillary, or supraclavicular adenopathy  CVS: s1s2 RRR, no M/R/G  RESP: CTA b/l, no crackles, no wheezes or rhonchi  Breast: Left: no masses, lumps, no nipple inversion, no peau d'orange, Right: fibrotic scar tissue around mastectomy site  ABD: Soft, NT, ND, BS+, no hepatosplenomegaly  EXT: POPE no deformities, no pedal edema but asymmetry with LLE calf diameter > RLE  SKIN: no rashes, no bruises or purpura, warm and dry  NEURO: normal mentation, strength 5/5 on all 4 extremities, no sensory deficits.       ASSESSMENT:  1. Malignant neoplasm of upper-outer quadrant of right female breast C50.411 stage IIIC RUQ IDC grade 2 HR+/HER2- s/p NA chemo with minimal to no pathologic response, s/p right mastectomy 6/2018. Completed EBRT 9/19/18. Arimidex initiated 10/20/18 and stopped 9/9/20 due to severe arthralgias  On Femara since 9/9/20 with same symptoms causing limitations in ADLs and quality of life, Femara has been on hold since 3/2021.Now on aromasin.  2. Osteoporosis M81.0 DEXA 10/8/18 revealed osteoporosis. Repeat DEXA 10/2020 showed multifocal osteoporosis as well. Next DEXA due 10/2022  She received 2 doses of prolia 11/2018 and 5/2019 then had interruption until 9/2020 at which point Prolia resumed.  c/w q6 months Prolia. Last dose 9/28/21  Continue Vitamin D + Calcium for bone health.    9/29/2023 " Assessment:    Patient doing well overall.  Compliant with Examestane, Vitamin D and calcium, and Prolia Q 6  months.  Subjective and objective exams are negative.    PLAN:  9/29/2023:  Continue antihormone treatment in the form of Examestane (Aromasin) until the 5 year marisa.  Will discuss with Dr. Alvarez 5 year marisa due to starting and stopping therapy.  Prolia due 10/12/2023.  Continue Vitamin D and Calcium daily for osteoporosis.  Continue to due MBSE.  Dexa Scan due in 2/2025.  Mammogram due in 2/2024    RTC in 6 months with labs.

## 2023-10-02 PROBLEM — C50.919 BREAST CANCER IN FEMALE: Status: ACTIVE | Noted: 2023-10-02

## 2023-10-02 RX ORDER — EXEMESTANE 25 MG/1
25 TABLET ORAL
Refills: 3 | OUTPATIENT
Start: 2023-10-02

## 2023-10-02 RX ORDER — EXEMESTANE 25 MG/1
25 TABLET ORAL DAILY
Qty: 90 TABLET | Refills: 3 | Status: SHIPPED | OUTPATIENT
Start: 2023-10-02

## 2024-04-04 NOTE — PROGRESS NOTES
Chief Complaint     Chief Complaint   Patient presents with    Follow-up     Referring Physician: Dr. Woods  PCP: Dr. Sreekanth Rinaldi, Dr. 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 Received neoadjuvant chemotherapy. Post mastectomy stage [id number] with 19 of 20 + LN and a 4.5 cm primary with no treatment effect.      Current Treatment:     March 2022-current:  Exemestane       Treatment History:     Neoadjuvant dd AC x 4 followed by ddTaxol x 4 cycles 1/30/18 to 3/15/18; dd Taxol initiated 4/3/18 to 5/15/18  Mastectomy 6/25/18  EBRT 8/8/18- 9/19/18  Arimidex 10/20/18- 9/9/20 (stopped due to increased arthralgias)  Prolia q 6 months (started 11/2018)  Femara 9/9/20- 3/15/21 (currently on hold due to arthralgias)     HPI/Clinical History:     Ms. Howard is a 83 yo female who went for a routine mammogram that was abnormal and they were subsequently able to palpate a mass in her breast.   Prior mammogram was  6/14/2016 read as benign.  11/29/17 Breast U/S revealed a 4.2 x 2.3 cm mass at 10:00.  A 12/22/17 core biopsy revealed invasive ductal carcinoma with lobular features, grade 2, No LVI and focal DCIS, ER 95%, TX 98% and HER 2  0; Ki67=7%  MRI breast center [facility] 12/28/17 revealed right breast 9:00 mass 4.1 x 1.5, 2.4 cm. There are numerous enlarged LN in the right axilla extending into the right axillary tail inferiorly and extending to the pectoralis minor muscle posteriorly and superiorly; highly suspicious.   On 1/11/18 she had a Mediport placed and biopsy of axillary LN that was negative.  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 CT abdomen no mets;  She initiated neoadjuvant ddAC x 4 followed by dd Taxol x 4 from 1/30/18 to 5/15/18. At mastectomy [id number]: she had a residual primary measuring 4.5 cm  with 19 of 20 + LN  with no treatment effect.   She started radiation 8/8/18 completed 9/19/18. She has been on arimidex since 10/2018. She has been on Prolia since 11/2018 received 2nd dose 5/2019 and then had interruption until 9/202 when she resumed  9/9/20 she was changed from arimidex to femara due to severe arthritic pains. Femara stopped 3/2021 due to increased arthralgias.     Interval History:    Today 04/08/2024, patient reports persistent tingling numbness in her bilateral lower extremities not controlled with Lyrica.  She denies any other acute concerns.  She denies any new lumps or bumps, decreased appetite or weight loss.  She denies any new medications, ER or hospital visits.  She reports compliance with exemestane, calcium and vitamin-D.       Laboratory     1/8/18 CBC WNL, CMP WNL except albumin 3.2.  2/12/18 WBC 9.73, H/H 11.7/37.1, , Bun/Cr 10.43/0.64  2/14/18 CA 27-29 was 16.9  CEA, CA 15-3 is outstanding as of OV 2/26/18 2/20/18 WBC 0.91, ANC 0.38, H/H 11.4/36.5, , CMP okay with glucose noted at 132, total protein low at 5.8, albumin 3.2, Mg 2.0  3/12/18 WBC 11.22, h/h 10.7/32.9, ANC 8370, , CMP okay, total protein at 6.0  5/30/18 WBC 6.21, H/H 10.2/32.7, MCV 95.3, , CMP okay, total protein at 5.7, Albumin 3.2, Alk Phos 144, AST 30, ALT 94 ( on 4/16/18 AST was 40, and ALT was 215)  5/14/18 WBC  10.19, H/H 10.3/32.9, MCV 96.6, , CMP okay total protein at 5.9, Albumin 3.3, Alk phos 115, AST 24, ALT 36  8/30/18 CBC and comp WNL  Labs from 9/26/18 reviewed and are good  2/7/19: WBC 4.86  H&H 13.0/31.2; CMP unremarkable    5/6/19: CBC and CMP WNL; Vit D 28  8/2/19 CBC CMP and Vit D WNL  11/18/19 CBC and CMP unremarkable  2/23/20 Cr 0.67  WBC 6.56 Hg 13.1   9/7/20 CBC and CMP unremarkable  3/11/21 CBC and CMP WNL  9/20/21 CBC and CMP WNL  3/21/22 CBC and CMP WNL     Imaging    4/4/18 MRI: axillary adenopathy decreased from 2.1 to 1.2 cm; breast mass decreased  from 4.1 to 3.5 cm  4/9/18 CT chest: resolved tiny LLL nodule; stable RML density (patient attributes to previous PE)  10/8/18: DEXA- osteoporosis in and right femoral head; osteopenia left femoral head  10/16/18 CT c/a/p- stable 2mm LLL nodule, post mastectomy fluid collection RCW  11/15/18: MMG left best- benign  1/30/19 CT Chest parenchymal opacity to right apex and peripheral right middle lobe, these areas do not have masslike appearance and are attributed to post radiation changes; left lower lobe nodule remains stable; right sided post operative seroma has increased in size  8/2/19 CT chest: decreased seroma, LLL nodule stable, right apical opacity stable  1/8/20 MMG screening left: no evidence of malignancy  9/30/20 US right breast fluid collection 8,5x2,6x0,4cm suggestive of seroma or lymphocele. Complex mass including prominent complex cyst with irregular wall thickness anterior aspect of residual right breast tissue  10/14/20 DEXA: T -.2.6 L spine, -2.7 left femur, -3.2 right femur  1/11/21 MMG screening: BIRADS2 benign  12/21/21MRI brain w/ and w/o contrast: Evolving right occipital hemorrhage has decreased in size with less edema. Consider follow-up brain MRI with and without contrast in 3-6 months to document expected evolutionary changes/resolution of enhancement, unless clinically warranted  sooner  1/18/22 MMG screening: BIRADS2    2/17/2023 DEXA: T -1.3 L spine, -2.5  left femur, -2.9 right femur     2/8/2023 MMG screening left breast No mammographic findings suspicious for malignancy     2/27/2023:  Nuclear Whole-Body Bone Scan 2/27/2023:  Scattered degenerative/arthritic activity of the axial and appendicular skeleton.  No evidence of skeletal metastatic disease.    Review of Systems  CONSTITUTIONAL: no fevers, no chills, no weight loss, no fatigue, no weakness  HEMATOLOGIC: no abnormal bleeding, no abnormal bruising, no drenching night sweats  ONCOLOGIC: no new masses or lumps  HEENT: no vision  loss, no tinnitus or hearing loss, no nose bleeding, no dysphagia, no odynophagia  CVS: no chest pain, no palpitations, no dyspnea on exertion  RESP: no shortness of breath, no hemoptysis, no cough  BREAST: no nipple discharge, no breast tenderness, no breast masses on self breast examination, s/p right mastectomy  GI: no nausea, no vomiting, no diarrhea, no constipation, no melena, no hematochezia, no hematemesis, no abdominal pain, no increase in abdominal girth  : no dysuria, no hematuria, no discharge  GYN: no abnormal vaginal bleeding, no dyspareunia, no vaginal discharge  INTEGUMENT: no rashes, no abnormal bruising, no nail pitting, no hyperpigmentation  NEURO: no falls, no memory loss, + neuropathy LLE (chronic), no urofecal incontinence or retention, no loss of strength on any extremity  MSK: no back pain, no joint pain, no joint swelling  PSYCH: no suicidal or homicidal ideation, no depression, no insomnia, no anhedonia  ENDOCRINE: no heat or cold intolerance, no polyuria, no polydipsia      Physical Exam    Vitals:    04/08/24 1416   BP: (!) 145/84   Pulse: 70   Resp: 18   Temp: 98.1 °F (36.7 °C)       GA: AAOx3, NAD  HEENT: NCAT, PERRLA, EOMI, no oral ulcers  LYMPH: no cervical, axillary, or supraclavicular adenopathy  CVS: s1s2 RRR, no M/R/G  RESP: CTA b/l, no crackles, no wheezes or rhonchi  Breast:  Deferred  ABD: Soft, NT, ND, BS+, no hepatosplenomegaly  EXT: POPE no deformities, no pedal edema but asymmetry with LLE calf diameter > RLE  SKIN: no rashes, no bruises or purpura, warm and dry  NEURO: normal mentation, strength 5/5 on all 4 extremities, no sensory deficits        Assessment/Plan    #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 Completed EBRT 9/19/18. Arimidex initiated 10/20/18 and stopped 9/9/20 due to severe arthralgias  Femara started on  9/9/20 with same symptoms causing limitations in ADLs and quality of life, Femara has been on hold since 3/2021. She is willing to try exemestane which was initiated in March 2022  Left MMG 1/2022 benign  CT chest on 1/30/19 with stable left lower lung nodule and right sided seroma. Repeat CT Chest 8/2019 essentially stable.      # Chemotherapy associated neuropathy   On pregabalin 75 mg b.i.d.  Patient reports poor control of neuropathy on pregabalin   Patient will be weaned off pregabalin with plans to start Cymbalta on 04/08/2024      # Osteoporosis  DEXA 10/8/18 revealed osteoporosis. Repeat DEXA 10/2020 showed multifocal osteoporosis as well. Next DEXA due 10/2022  She received 2 doses of prolia 11/2018 and 5/2019 then had interruption until 9/2020 at which point Prolia resumed.  c/w q6 months Prolia.   Continue Vitamin D + Calcium for bone health  Next DEXA scan due in February 2025    Plan   Continue exemestane 25 mg q.d.  Continue calcium and vitamin-D supplementation   Patient was due for left screening mammogram, ordered today  Plan to wean her off pregabalin over the next 3 weeks   Cymbalta 30 mg q.h.s. for peripheral neuropathy  Plan to follow-up in 5 weeks to assess treatment tolerance and discuss above workup.  Prolia due at next visit  Patient was continue exemestane at least up until September 2025, and September 2026 if consider 1 year break in between    A total of  40 minutes were spent in review of records, interpretation of test, coordination of care, discussion and counseling with the patient.        Portions of the record may have been created with voice recognition software. Occasional wrong-word or sound-a-like substitutions may have occurred due to the inherent limitations of voice recognition software.

## 2024-04-08 ENCOUNTER — OFFICE VISIT (OUTPATIENT)
Dept: HEMATOLOGY/ONCOLOGY | Facility: CLINIC | Age: 84
End: 2024-04-08
Payer: MEDICARE

## 2024-04-08 VITALS
HEART RATE: 70 BPM | SYSTOLIC BLOOD PRESSURE: 145 MMHG | WEIGHT: 160.13 LBS | OXYGEN SATURATION: 96 % | HEIGHT: 61 IN | TEMPERATURE: 98 F | RESPIRATION RATE: 18 BRPM | DIASTOLIC BLOOD PRESSURE: 84 MMHG | BODY MASS INDEX: 30.23 KG/M2

## 2024-04-08 DIAGNOSIS — Z12.39 BREAST CANCER SCREENING, HIGH RISK PATIENT: ICD-10-CM

## 2024-04-08 DIAGNOSIS — Z12.31 ENCOUNTER FOR SCREENING MAMMOGRAM FOR MALIGNANT NEOPLASM OF BREAST: ICD-10-CM

## 2024-04-08 DIAGNOSIS — M81.0 OSTEOPOROSIS, UNSPECIFIED OSTEOPOROSIS TYPE, UNSPECIFIED PATHOLOGICAL FRACTURE PRESENCE: ICD-10-CM

## 2024-04-08 DIAGNOSIS — Z17.0 MALIGNANT NEOPLASM OF RIGHT BREAST IN FEMALE, ESTROGEN RECEPTOR POSITIVE, UNSPECIFIED SITE OF BREAST: Primary | ICD-10-CM

## 2024-04-08 DIAGNOSIS — C50.911 MALIGNANT NEOPLASM OF RIGHT BREAST IN FEMALE, ESTROGEN RECEPTOR POSITIVE, UNSPECIFIED SITE OF BREAST: Primary | ICD-10-CM

## 2024-04-08 PROCEDURE — 99215 OFFICE O/P EST HI 40 MIN: CPT | Mod: ,,, | Performed by: STUDENT IN AN ORGANIZED HEALTH CARE EDUCATION/TRAINING PROGRAM

## 2024-04-08 PROCEDURE — 1159F MED LIST DOCD IN RCRD: CPT | Mod: CPTII,,, | Performed by: STUDENT IN AN ORGANIZED HEALTH CARE EDUCATION/TRAINING PROGRAM

## 2024-04-08 PROCEDURE — 1126F AMNT PAIN NOTED NONE PRSNT: CPT | Mod: CPTII,,, | Performed by: STUDENT IN AN ORGANIZED HEALTH CARE EDUCATION/TRAINING PROGRAM

## 2024-04-08 PROCEDURE — 3288F FALL RISK ASSESSMENT DOCD: CPT | Mod: CPTII,,, | Performed by: STUDENT IN AN ORGANIZED HEALTH CARE EDUCATION/TRAINING PROGRAM

## 2024-04-08 PROCEDURE — 1101F PT FALLS ASSESS-DOCD LE1/YR: CPT | Mod: CPTII,,, | Performed by: STUDENT IN AN ORGANIZED HEALTH CARE EDUCATION/TRAINING PROGRAM

## 2024-04-08 PROCEDURE — 3079F DIAST BP 80-89 MM HG: CPT | Mod: CPTII,,, | Performed by: STUDENT IN AN ORGANIZED HEALTH CARE EDUCATION/TRAINING PROGRAM

## 2024-04-08 PROCEDURE — 3077F SYST BP >= 140 MM HG: CPT | Mod: CPTII,,, | Performed by: STUDENT IN AN ORGANIZED HEALTH CARE EDUCATION/TRAINING PROGRAM

## 2024-04-08 RX ORDER — PREGABALIN 25 MG/1
25 CAPSULE ORAL SEE ADMIN INSTRUCTIONS
Qty: 50 CAPSULE | Refills: 0 | Status: SHIPPED | OUTPATIENT
Start: 2024-04-08 | End: 2024-10-07

## 2024-04-08 RX ORDER — DULOXETIN HYDROCHLORIDE 30 MG/1
30 CAPSULE, DELAYED RELEASE ORAL DAILY
Qty: 30 CAPSULE | Refills: 2 | Status: SHIPPED | OUTPATIENT
Start: 2024-04-08 | End: 2025-04-08

## 2024-06-30 DIAGNOSIS — C50.911 MALIGNANT NEOPLASM OF RIGHT BREAST IN FEMALE, ESTROGEN RECEPTOR POSITIVE, UNSPECIFIED SITE OF BREAST: ICD-10-CM

## 2024-06-30 DIAGNOSIS — Z17.0 MALIGNANT NEOPLASM OF RIGHT BREAST IN FEMALE, ESTROGEN RECEPTOR POSITIVE, UNSPECIFIED SITE OF BREAST: ICD-10-CM

## 2024-07-01 RX ORDER — DULOXETIN HYDROCHLORIDE 30 MG/1
CAPSULE, DELAYED RELEASE ORAL
Qty: 30 CAPSULE | Refills: 2 | Status: SHIPPED | OUTPATIENT
Start: 2024-07-01

## 2024-07-23 DIAGNOSIS — Z17.0 MALIGNANT NEOPLASM OF RIGHT BREAST IN FEMALE, ESTROGEN RECEPTOR POSITIVE, UNSPECIFIED SITE OF BREAST: ICD-10-CM

## 2024-07-23 DIAGNOSIS — C50.911 MALIGNANT NEOPLASM OF RIGHT BREAST IN FEMALE, ESTROGEN RECEPTOR POSITIVE, UNSPECIFIED SITE OF BREAST: ICD-10-CM

## 2024-07-23 RX ORDER — EXEMESTANE 25 MG/1
25 TABLET ORAL DAILY
Qty: 90 TABLET | Refills: 3 | Status: SHIPPED | OUTPATIENT
Start: 2024-07-23

## 2024-07-29 ENCOUNTER — OFFICE VISIT (OUTPATIENT)
Dept: HEMATOLOGY/ONCOLOGY | Facility: CLINIC | Age: 84
End: 2024-07-29
Payer: MEDICARE

## 2024-07-29 VITALS
SYSTOLIC BLOOD PRESSURE: 141 MMHG | TEMPERATURE: 98 F | BODY MASS INDEX: 28.04 KG/M2 | RESPIRATION RATE: 14 BRPM | DIASTOLIC BLOOD PRESSURE: 82 MMHG | OXYGEN SATURATION: 93 % | WEIGHT: 148.5 LBS | HEIGHT: 61 IN | HEART RATE: 90 BPM

## 2024-07-29 DIAGNOSIS — C50.411 MALIGNANT NEOPLASM OF UPPER-OUTER QUADRANT OF RIGHT BREAST IN FEMALE, ESTROGEN RECEPTOR POSITIVE: Primary | ICD-10-CM

## 2024-07-29 DIAGNOSIS — Z17.0 MALIGNANT NEOPLASM OF UPPER-OUTER QUADRANT OF RIGHT BREAST IN FEMALE, ESTROGEN RECEPTOR POSITIVE: Primary | ICD-10-CM

## 2024-07-29 DIAGNOSIS — M81.0 OSTEOPOROSIS, UNSPECIFIED OSTEOPOROSIS TYPE, UNSPECIFIED PATHOLOGICAL FRACTURE PRESENCE: ICD-10-CM

## 2024-07-29 NOTE — PROGRESS NOTES
Chief Complaint     Chief Complaint   Patient presents with    Follow-up     Patient reports pain in the left breast that's been present for about a week.      Referring Physician: Dr. Woods  PCP: Dr. Sreekanth Rinaldi, Dr. 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 Received neoadjuvant chemotherapy. Post mastectomy stage [id number] with 19 of 20 + LN and a 4.5 cm primary with no treatment effect.      Current Treatment:     March 2022-current:  Exemestane       Treatment History:     Neoadjuvant dd AC x 4 followed by ddTaxol x 4 cycles 1/30/18 to 3/15/18; dd Taxol initiated 4/3/18 to 5/15/18  Mastectomy 6/25/18  EBRT 8/8/18- 9/19/18  Arimidex 10/20/18- 9/9/20 (stopped due to increased arthralgias)  Prolia q 6 months (started 11/2018)  Femara 9/9/20- 3/15/21 (stopped due to arthralgias)     HPI/Clinical History:     Ms. Howard is a 85 yo female who went for a routine mammogram that was abnormal and they were subsequently able to palpate a mass in her breast.   Prior mammogram was  6/14/2016 read as benign.  11/29/17 Breast U/S revealed a 4.2 x 2.3 cm mass at 10:00.  A 12/22/17 core biopsy revealed invasive ductal carcinoma with lobular features, grade 2, No LVI and focal DCIS, ER 95%, [place] 98% and HER 2  0; Ki67=7%  MRI breast center [facility] 12/28/17 revealed right breast 9:00 mass 4.1 x 1.5, 2.4 cm. There are numerous enlarged LN in the right axilla extending into the right axillary tail inferiorly and extending to the pectoralis minor muscle posteriorly and superiorly; highly suspicious.   On 1/11/18 she had a Mediport placed and biopsy of axillary LN that was negative.  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 CT abdomen no mets;  She initiated neoadjuvant ddAC x 4 followed by dd Taxol x 4 from 1/30/18 to  5/15/18. At mastectomy [id number]: she had a residual primary measuring 4.5 cm with 19 of 20 + LN  with no treatment effect.   She started radiation 8/8/18 completed 9/19/18. She has been on arimidex since 10/2018. She has been on Prolia since 11/2018 received 2nd dose 5/2019 and then had interruption until 9/202 when she resumed  9/9/20 she was changed from arimidex to femara due to severe arthritic pains. Femara stopped 3/2021 due to increased arthralgias.     Interval History:    Today 07/29/2024, patient reports symptoms of bilateral knee pain.  She reports improvement in his symptoms of peripheral neuropathy on Cymbalta.  She denies any new pain, decreased appetite or weight loss.  She reports compliance with exemestane.       Laboratory     1/8/18 CBC WNL, CMP WNL except albumin 3.2.  2/12/18 WBC 9.73, H/H 11.7/37.1, , Bun/Cr 10.43/0.64  2/14/18 CA 27-29 was 16.9  CEA, CA 15-3 is outstanding as of OV 2/26/18 2/20/18 WBC 0.91, ANC 0.38, H/H 11.4/36.5, , CMP okay with glucose noted at 132, total protein low at 5.8, albumin 3.2, Mg 2.0  3/12/18 WBC 11.22, h/h 10.7/32.9, ANC 8370, , CMP okay, total protein at 6.0  5/30/18 WBC 6.21, H/H 10.2/32.7, MCV 95.3, , CMP okay, total protein at 5.7, Albumin 3.2, Alk Phos 144, AST 30, ALT 94 ( on 4/16/18 AST was 40, and ALT was 215)  5/14/18 WBC  10.19, H/H 10.3/32.9, MCV 96.6, , CMP okay total protein at 5.9, Albumin 3.3, Alk phos 115, AST 24, ALT 36  8/30/18 CBC and comp WNL  Labs from 9/26/18 reviewed and are good  2/7/19: WBC 4.86  H&H 13.0/31.2; CMP unremarkable    5/6/19: CBC and CMP WNL; Vit D 28  8/2/19 CBC CMP and Vit D WNL  11/18/19 CBC and CMP unremarkable  2/23/20 Cr 0.67  WBC 6.56 Hg 13.1   9/7/20 CBC and CMP unremarkable  3/11/21 CBC and CMP WNL  9/20/21 CBC and CMP WNL  3/21/22 CBC and CMP WNL       Imaging    4/4/18 MRI: axillary adenopathy decreased from 2.1 to 1.2 cm; breast mass decreased from 4.1 to 3.5 cm  4/9/18  CT chest: resolved tiny LLL nodule; stable RML density (patient attributes to previous PE)  10/8/18: DEXA- osteoporosis in and right femoral head; osteopenia left femoral head  10/16/18 CT c/a/p- stable 2mm LLL nodule, post mastectomy fluid collection RCW  11/15/18: MMG left best- benign  1/30/19 CT Chest parenchymal opacity to right apex and peripheral right middle lobe, these areas do not have masslike appearance and are attributed to post radiation changes; left lower lobe nodule remains stable; right sided post operative seroma has increased in size  8/2/19 CT chest: decreased seroma, LLL nodule stable, right apical opacity stable  1/8/20 MMG screening left: no evidence of malignancy  9/30/20 US right breast fluid collection 8,5x2,6x0,4cm suggestive of seroma or lymphocele. Complex mass including prominent complex cyst with irregular wall thickness anterior aspect of residual right breast tissue  10/14/20 DEXA: T -.2.6 L spine, -2.7 left femur, -3.2 right femur  1/11/21 MMG screening: BIRADS2 benign  12/21/21MRI brain w/ and w/o contrast: Evolving right occipital hemorrhage has decreased in size with less edema. Consider follow-up brain MRI with and without contrast in 3-6 months to document expected evolutionary changes/resolution of enhancement, unless clinically warranted  sooner  1/18/22 MMG screening: BIRADS2    2/17/2023 DEXA: T -1.3 L spine, -2.5  left femur, -2.9 right femur     2/8/2023 MMG screening left breast No mammographic findings suspicious for malignancy     2/27/2023:  Nuclear Whole-Body Bone Scan 2/27/2023:  Scattered degenerative/arthritic activity of the axial and appendicular skeleton.  No evidence of skeletal metastatic disease.      07/24/2024 left breast mammogram BI-RADS 2    Review of Systems  CONSTITUTIONAL: no fevers, no chills, no weight loss, no fatigue, no weakness  HEMATOLOGIC: no abnormal bleeding, no abnormal bruising, no drenching night sweats  ONCOLOGIC: no new masses or  lumps  HEENT: no vision loss, no tinnitus or hearing loss, no nose bleeding, no dysphagia, no odynophagia  CVS: no chest pain, no palpitations, no dyspnea on exertion  RESP: no shortness of breath, no hemoptysis, no cough  BREAST: no nipple discharge, no breast tenderness, no breast masses on self breast examination, s/p right mastectomy  GI: no nausea, no vomiting, no diarrhea, no constipation, no melena, no hematochezia, no hematemesis, no abdominal pain, no increase in abdominal girth  : no dysuria, no hematuria, no discharge  GYN: no abnormal vaginal bleeding, no dyspareunia, no vaginal discharge  INTEGUMENT: no rashes, no abnormal bruising, no nail pitting, no hyperpigmentation  NEURO: no falls, no memory loss, + neuropathy LLE (chronic), no urofecal incontinence or retention, no loss of strength on any extremity  MSK: no back pain, no joint pain, no joint swelling  PSYCH: no suicidal or homicidal ideation, no depression, no insomnia, no anhedonia  ENDOCRINE: no heat or cold intolerance, no polyuria, no polydipsia      Physical Exam    Vitals:    07/29/24 1326   BP: (!) 141/82   Pulse: 90   Resp: 14   Temp: 97.7 °F (36.5 °C)         GA: AAOx3, NAD  HEENT: NCAT, PERRLA, EOMI, no oral ulcers  LYMPH: no cervical, axillary, or supraclavicular adenopathy  CVS: s1s2 RRR, no M/R/G  RESP: CTA b/l, no crackles, no wheezes or rhonchi  Breast:  Deferred  ABD: Soft, NT, ND, BS+, no hepatosplenomegaly  EXT: POPE no deformities, no pedal edema but asymmetry with LLE calf diameter > RLE  SKIN: no rashes, no bruises or purpura, warm and dry  NEURO: normal mentation, strength 5/5 on all 4 extremities, no sensory deficits        Assessment/Plan    #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 Completed EBRT 9/19/18. Arimidex initiated 10/20/18 and stopped 9/9/20 due to severe  arthralgias  Femara started on 9/9/20 with same symptoms causing limitations in ADLs and quality of life, Femara has been on hold since 3/2021. She is willing to try exemestane which was initiated in March 2022  Left MMG 1/2022 benign  CT chest on 1/30/19 with stable left lower lung nodule and right sided seroma. Repeat CT Chest 8/2019 essentially stable.      # Chemotherapy associated neuropathy   On pregabalin 75 mg b.i.d.  Patient reports poor control of neuropathy on pregabalin   Patient will be weaned off pregabalin with plans to start Cymbalta on 04/08/2024      # Osteoporosis  DEXA 10/8/18 revealed osteoporosis. Repeat DEXA 10/2020 showed multifocal osteoporosis as well. Next DEXA due 10/2022  She received 2 doses of prolia 11/2018 and 5/2019 then had interruption until 9/2020 at which point Prolia resumed.  c/w q6 months Prolia.   Continue Vitamin D + Calcium for bone health  Next DEXA scan due in February 2025    Plan   Continue exemestane 25 mg q.d.  Continue calcium and vitamin-D supplementation   Increase Cymbalta to 60 mg q.h.s. for peripheral neuropathy  Plan to follow-up in 4 months, no labs  She will be due for her Prolia at next visit with us.  Patient was continue exemestane at least up until September 2025, and September 2026 if consider 1 year break in between    A total of  30 minutes were spent in review of records, interpretation of test, coordination of care, discussion and counseling with the patient.        Portions of the record may have been created with voice recognition software. Occasional wrong-word or sound-a-like substitutions may have occurred due to the inherent limitations of voice recognition software.

## 2024-10-08 DIAGNOSIS — Z17.0 MALIGNANT NEOPLASM OF RIGHT BREAST IN FEMALE, ESTROGEN RECEPTOR POSITIVE, UNSPECIFIED SITE OF BREAST: ICD-10-CM

## 2024-10-08 DIAGNOSIS — C50.911 MALIGNANT NEOPLASM OF RIGHT BREAST IN FEMALE, ESTROGEN RECEPTOR POSITIVE, UNSPECIFIED SITE OF BREAST: ICD-10-CM

## 2024-10-08 RX ORDER — DULOXETIN HYDROCHLORIDE 30 MG/1
CAPSULE, DELAYED RELEASE ORAL
Qty: 30 CAPSULE | Refills: 2 | Status: SHIPPED | OUTPATIENT
Start: 2024-10-08

## 2025-01-13 ENCOUNTER — OFFICE VISIT (OUTPATIENT)
Dept: HEMATOLOGY/ONCOLOGY | Facility: CLINIC | Age: 85
End: 2025-01-13
Payer: MEDICARE

## 2025-01-13 VITALS
TEMPERATURE: 98 F | SYSTOLIC BLOOD PRESSURE: 138 MMHG | BODY MASS INDEX: 27.32 KG/M2 | DIASTOLIC BLOOD PRESSURE: 84 MMHG | OXYGEN SATURATION: 99 % | HEIGHT: 61 IN | WEIGHT: 144.69 LBS | RESPIRATION RATE: 14 BRPM | HEART RATE: 86 BPM

## 2025-01-13 DIAGNOSIS — C50.811 MALIGNANT NEOPLASM OF OVERLAPPING SITES OF RIGHT BREAST IN FEMALE, ESTROGEN RECEPTOR POSITIVE: ICD-10-CM

## 2025-01-13 DIAGNOSIS — Z17.0 MALIGNANT NEOPLASM OF RIGHT BREAST IN FEMALE, ESTROGEN RECEPTOR POSITIVE, UNSPECIFIED SITE OF BREAST: ICD-10-CM

## 2025-01-13 DIAGNOSIS — Z12.39 BREAST CANCER SCREENING, HIGH RISK PATIENT: ICD-10-CM

## 2025-01-13 DIAGNOSIS — Z12.31 ENCOUNTER FOR SCREENING MAMMOGRAM FOR MALIGNANT NEOPLASM OF BREAST: ICD-10-CM

## 2025-01-13 DIAGNOSIS — Z17.0 MALIGNANT NEOPLASM OF OVERLAPPING SITES OF RIGHT BREAST IN FEMALE, ESTROGEN RECEPTOR POSITIVE: ICD-10-CM

## 2025-01-13 DIAGNOSIS — M81.0 OSTEOPOROSIS OF FEMUR WITHOUT PATHOLOGICAL FRACTURE: Primary | ICD-10-CM

## 2025-01-13 DIAGNOSIS — C50.911 MALIGNANT NEOPLASM OF RIGHT BREAST IN FEMALE, ESTROGEN RECEPTOR POSITIVE, UNSPECIFIED SITE OF BREAST: ICD-10-CM

## 2025-01-13 PROCEDURE — 3075F SYST BP GE 130 - 139MM HG: CPT | Mod: CPTII,,,

## 2025-01-13 PROCEDURE — 3079F DIAST BP 80-89 MM HG: CPT | Mod: CPTII,,,

## 2025-01-13 PROCEDURE — 99215 OFFICE O/P EST HI 40 MIN: CPT | Mod: ,,,

## 2025-01-13 PROCEDURE — 1159F MED LIST DOCD IN RCRD: CPT | Mod: CPTII,,,

## 2025-01-13 PROCEDURE — 3288F FALL RISK ASSESSMENT DOCD: CPT | Mod: CPTII,,,

## 2025-01-13 PROCEDURE — 1101F PT FALLS ASSESS-DOCD LE1/YR: CPT | Mod: CPTII,,,

## 2025-01-13 PROCEDURE — 1126F AMNT PAIN NOTED NONE PRSNT: CPT | Mod: CPTII,,,

## 2025-01-13 RX ORDER — DULOXETIN HYDROCHLORIDE 30 MG/1
30 CAPSULE, DELAYED RELEASE ORAL DAILY
Qty: 30 CAPSULE | Refills: 6 | Status: SHIPPED | OUTPATIENT
Start: 2025-01-13

## 2025-01-13 RX ORDER — EXEMESTANE 25 MG/1
25 TABLET ORAL DAILY
Qty: 90 TABLET | Refills: 3 | Status: SHIPPED | OUTPATIENT
Start: 2025-01-13

## 2025-01-13 NOTE — PROGRESS NOTES
Chief Complaint     Chief Complaint   Patient presents with    no complaints      Referring Physician: Dr. Woods  PCP: Dr. Srekeanth Rinaldi, Dr. 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 Received neoadjuvant chemotherapy. Post mastectomy stage [id number] with 19 of 20 + LN and a 4.5 cm primary with no treatment effect.      Current Treatment:     March 2022-current:  Exemestane       Treatment History:     Neoadjuvant dd AC x 4 followed by ddTaxol x 4 cycles 1/30/18 to 3/15/18; dd Taxol initiated 4/3/18 to 5/15/18  Mastectomy 6/25/18  EBRT 8/8/18- 9/19/18  Arimidex 10/20/18- 9/9/20 (stopped due to increased arthralgias)  Prolia q 6 months (started 11/2018)  Femara 9/9/20- 3/15/21 (stopped due to arthralgias)     HPI/Clinical History:     Ms. Howard is a 83 yo female who went for a routine mammogram that was abnormal and they were subsequently able to palpate a mass in her breast.   Prior mammogram was  6/14/2016 read as benign.  11/29/17 Breast U/S revealed a 4.2 x 2.3 cm mass at 10:00.  A 12/22/17 core biopsy revealed invasive ductal carcinoma with lobular features, grade 2, No LVI and focal DCIS, ER 95%, DC 98% and HER 2  0; Ki67=7%  MRI breast center [facility] 12/28/17 revealed right breast 9:00 mass 4.1 x 1.5, 2.4 cm. There are numerous enlarged LN in the right axilla extending into the right axillary tail inferiorly and extending to the pectoralis minor muscle posteriorly and superiorly; highly suspicious.   On 1/11/18 she had a Mediport placed and biopsy of axillary LN that was negative.  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 CT abdomen no mets;  She initiated neoadjuvant ddAC x 4 followed by dd Taxol x 4 from 1/30/18 to 5/15/18. At mastectomy [id number]: she had a residual primary measuring 4.5 cm with  19 of 20 + LN  with no treatment effect.   She started radiation 8/8/18 completed 9/19/18. She has been on arimidex since 10/2018. She has been on Prolia since 11/2018 received 2nd dose 5/2019 and then had interruption until 9/202 when she resumed  9/9/20 she was changed from arimidex to femara due to severe arthritic pains. Femara stopped 3/2021 due to increased arthralgias.     Interval History:    Today 01/13/2025, patient returns for follow-up.  She reports improvement in his symptoms of peripheral neuropathy on Cymbalta 30 mg daily.  She denies any new pain, decreased appetite or weight loss.  She reports compliance with exemestane.       Laboratory     1/8/18 CBC WNL, CMP WNL except albumin 3.2.  2/12/18 WBC 9.73, H/H 11.7/37.1, , Bun/Cr 10.43/0.64  2/14/18 CA 27-29 was 16.9  CEA, CA 15-3 is outstanding as of OV 2/26/18 2/20/18 WBC 0.91, ANC 0.38, H/H 11.4/36.5, , CMP okay with glucose noted at 132, total protein low at 5.8, albumin 3.2, Mg 2.0  3/12/18 WBC 11.22, h/h 10.7/32.9, ANC 8370, , CMP okay, total protein at 6.0  5/30/18 WBC 6.21, H/H 10.2/32.7, MCV 95.3, , CMP okay, total protein at 5.7, Albumin 3.2, Alk Phos 144, AST 30, ALT 94 ( on 4/16/18 AST was 40, and ALT was 215)  5/14/18 WBC  10.19, H/H 10.3/32.9, MCV 96.6, , CMP okay total protein at 5.9, Albumin 3.3, Alk phos 115, AST 24, ALT 36  8/30/18 CBC and comp WNL  Labs from 9/26/18 reviewed and are good  2/7/19: WBC 4.86  H&H 13.0/31.2; CMP unremarkable    5/6/19: CBC and CMP WNL; Vit D 28  8/2/19 CBC CMP and Vit D WNL  11/18/19 CBC and CMP unremarkable  2/23/20 Cr 0.67  WBC 6.56 Hg 13.1   9/7/20 CBC and CMP unremarkable  3/11/21 CBC and CMP WNL  9/20/21 CBC and CMP WNL  3/21/22 CBC and CMP WNL       Imaging    4/4/18 MRI: axillary adenopathy decreased from 2.1 to 1.2 cm; breast mass decreased from 4.1 to 3.5 cm  4/9/18 CT chest: resolved tiny LLL nodule; stable RML density (patient attributes to previous  PE)  10/8/18: DEXA- osteoporosis in and right femoral head; osteopenia left femoral head  10/16/18 CT c/a/p- stable 2mm LLL nodule, post mastectomy fluid collection RCW  11/15/18: MMG left best- benign  1/30/19 CT Chest parenchymal opacity to right apex and peripheral right middle lobe, these areas do not have masslike appearance and are attributed to post radiation changes; left lower lobe nodule remains stable; right sided post operative seroma has increased in size  8/2/19 CT chest: decreased seroma, LLL nodule stable, right apical opacity stable  1/8/20 MMG screening left: no evidence of malignancy  9/30/20 US right breast fluid collection 8,5x2,6x0,4cm suggestive of seroma or lymphocele. Complex mass including prominent complex cyst with irregular wall thickness anterior aspect of residual right breast tissue  10/14/20 DEXA: T -.2.6 L spine, -2.7 left femur, -3.2 right femur  1/11/21 MMG screening: BIRADS2 benign  12/21/21MRI brain w/ and w/o contrast: Evolving right occipital hemorrhage has decreased in size with less edema. Consider follow-up brain MRI with and without contrast in 3-6 months to document expected evolutionary changes/resolution of enhancement, unless clinically warranted  sooner  1/18/22 MMG screening: BIRADS2    2/17/2023 DEXA: T -1.3 L spine, -2.5  left femur, -2.9 right femur     2/8/2023 MMG screening left breast No mammographic findings suspicious for malignancy     2/27/2023:  Nuclear Whole-Body Bone Scan 2/27/2023:  Scattered degenerative/arthritic activity of the axial and appendicular skeleton.  No evidence of skeletal metastatic disease.      07/24/2024 left breast mammogram BI-RADS 2    Review of Systems  CONSTITUTIONAL: no fevers, no chills, no weight loss, no fatigue, no weakness  HEMATOLOGIC: no abnormal bleeding, no abnormal bruising, no drenching night sweats  ONCOLOGIC: no new masses or lumps  HEENT: no vision loss, no tinnitus or hearing loss, no nose bleeding, no dysphagia,  no odynophagia  CVS: no chest pain, no palpitations, no dyspnea on exertion  RESP: no shortness of breath, no hemoptysis, no cough  BREAST: no nipple discharge, no breast tenderness, no breast masses on self breast examination, s/p right mastectomy  GI: no nausea, no vomiting, no diarrhea, no constipation, no melena, no hematochezia, no hematemesis, no abdominal pain, no increase in abdominal girth  : no dysuria, no hematuria, no discharge  GYN: no abnormal vaginal bleeding, no dyspareunia, no vaginal discharge  INTEGUMENT: no rashes, no abnormal bruising, no nail pitting, no hyperpigmentation  NEURO: no falls, no memory loss, + neuropathy LLE (chronic), no urofecal incontinence or retention, no loss of strength on any extremity  MSK: no back pain, no joint pain, no joint swelling  PSYCH: no suicidal or homicidal ideation, no depression, no insomnia, no anhedonia  ENDOCRINE: no heat or cold intolerance, no polyuria, no polydipsia      Physical Exam    Vitals:    01/13/25 0949   BP: 138/84   Pulse: 86   Resp: 14   Temp: 97.7 °F (36.5 °C)         GA: AAOx3, NAD  HEENT: NCAT, PERRLA, EOMI, no oral ulcers  LYMPH: no cervical, axillary, or supraclavicular adenopathy  CVS: s1s2 RRR, no M/R/G  RESP: CTA b/l, no crackles, no wheezes or rhonchi  Breast:  Deferred  ABD: Soft, NT, ND, BS+, no hepatosplenomegaly  EXT: POPE no deformities, no pedal edema but asymmetry with LLE calf diameter > RLE  SKIN: no rashes, no bruises or purpura, warm and dry  NEURO: normal mentation, strength 5/5 on all 4 extremities, no sensory deficits        Assessment/Plan    #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 Completed EBRT 9/19/18. Arimidex initiated 10/20/18 and stopped 9/9/20 due to severe arthralgias  Femara started on 9/9/20 with same symptoms causing limitations in ADLs and quality of  life, Femara has been on hold since 3/2021. She is willing to try exemestane which was initiated in March 2022  Left MMG 1/2022 benign  CT chest on 1/30/19 with stable left lower lung nodule and right sided seroma. Repeat CT Chest 8/2019 essentially stable.      # Chemotherapy associated neuropathy   On pregabalin 75 mg b.i.d.  Patient reports poor control of neuropathy on pregabalin   Patient will be weaned off pregabalin with plans to start Cymbalta on 04/08/2024      # Osteoporosis  DEXA 10/8/18 revealed osteoporosis. Repeat DEXA 10/2020 showed multifocal osteoporosis as well. Next DEXA due 10/2022  She received 2 doses of prolia 11/2018 and 5/2019 then had interruption until 9/2020 at which point Prolia resumed.  c/w q6 months Prolia.   Continue Vitamin D + Calcium for bone health  Next DEXA scan due in February 2025    Plan   Continue exemestane 25 mg q.d.  Continue calcium and vitamin-D supplementation   Continue with Cymbalta to 30 mg  daily for peripheral neuropathy  Plan to follow-up in 6 months, BMP prior to Prolia  She will be due for her Prolia 7/3/25 need BMP ordered today.   Patient was continue exemestane at least up until September 2025, and September 2026 if consider 1 year break in between  Dexa due 2/2025 ordered today  Left breast MMG due on RTC ordered today    A total of  40 minutes were spent in review of records, interpretation of test, coordination of care, discussion and counseling with the patient.

## 2025-02-20 LAB — BMD RECOMMENDATION EXT: NORMAL

## 2025-05-02 DIAGNOSIS — C50.911 MALIGNANT NEOPLASM OF RIGHT BREAST IN FEMALE, ESTROGEN RECEPTOR POSITIVE, UNSPECIFIED SITE OF BREAST: ICD-10-CM

## 2025-05-02 DIAGNOSIS — Z17.0 MALIGNANT NEOPLASM OF RIGHT BREAST IN FEMALE, ESTROGEN RECEPTOR POSITIVE, UNSPECIFIED SITE OF BREAST: ICD-10-CM

## 2025-05-02 RX ORDER — DULOXETIN HYDROCHLORIDE 30 MG/1
30 CAPSULE, DELAYED RELEASE ORAL DAILY
Qty: 30 CAPSULE | Refills: 6 | Status: SHIPPED | OUTPATIENT
Start: 2025-05-02 | End: 2025-06-01

## 2025-07-02 ENCOUNTER — TELEPHONE (OUTPATIENT)
Dept: HEMATOLOGY/ONCOLOGY | Facility: CLINIC | Age: 85
End: 2025-07-02

## 2025-07-02 NOTE — TELEPHONE ENCOUNTER
Spoke with Pt states is $150 per injection and unable to afford. Spoke with [employer] concerning copay assistance states copay assistance is only available for commercial and marketplace insurance.--SC, LPN

## 2025-07-02 NOTE — TELEPHONE ENCOUNTER
----- Message from Sadia sent at 6/30/2025  2:24 PM CDT -----  Regarding: refused prolia  Alina    Ms. Howard does not want to receive her Prolia injection.      Amena

## 2025-07-02 NOTE — PROGRESS NOTES
Chief Complaint     No chief complaint on file.    Referring Physician: Dr. Woods  PCP: Dr. Sreekanth Rinaldi, Dr. 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 Received neoadjuvant chemotherapy. Post mastectomy stage [id number] with 19 of 20 + LN and a 4.5 cm primary with no treatment effect.      Current Treatment:     March 2022-current:  Exemestane       Treatment History:     Neoadjuvant dd AC x 4 followed by ddTaxol x 4 cycles 1/30/18 to 3/15/18; dd Taxol initiated 4/3/18 to 5/15/18  Mastectomy 6/25/18  EBRT 8/8/18- 9/19/18  Arimidex 10/20/18- 9/9/20 (stopped due to increased arthralgias)  Prolia q 6 months (started 11/2018)  Femara 9/9/20- 3/15/21 (stopped due to arthralgias)     HPI/Clinical History:     Ms. Howard is a 83 yo female who went for a routine mammogram that was abnormal and they were subsequently able to palpate a mass in her breast.   Prior mammogram was  6/14/2016 read as benign.  11/29/17 Breast U/S revealed a 4.2 x 2.3 cm mass at 10:00.  A 12/22/17 core biopsy revealed invasive ductal carcinoma with lobular features, grade 2, No LVI and focal DCIS, ER 95%, MS 98% and HER 2  0; Ki67=7%  MRI breast [facility] 12/28/17 revealed right breast 9:00 mass 4.1 x 1.5, 2.4 cm. There are numerous enlarged LN in the right axilla extending into the right axillary tail inferiorly and extending to the pectoralis minor muscle posteriorly and superiorly; highly suspicious.   On 1/11/18 she had a Mediport placed and biopsy of axillary LN that was negative.  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 CT abdomen no mets;  She initiated neoadjuvant ddAC x 4 followed by dd Taxol x 4 from 1/30/18 to 5/15/18. At mastectomy [id number]: she had a residual primary measuring 4.5 cm with 19 of 20 + LN  with no treatment  effect.   She started radiation 8/8/18 completed 9/19/18. She has been on arimidex since 10/2018. She has been on Prolia since 11/2018 received 2nd dose 5/2019 and then had interruption until 9/202 when she resumed  9/9/20 she was changed from arimidex to femara due to severe arthritic pains. Femara stopped 3/2021 due to increased arthralgias.     Interval History:    Today 07/3/2025, patient returns for follow-up. She denies any new pain, decreased appetite or weight loss.  She reports compliance with exemestane. Ca 9.66 today ok to get prolia.    Laboratory     1/8/18 CBC WNL, CMP WNL except albumin 3.2.  2/12/18 WBC 9.73, H/H 11.7/37.1, , Bun/Cr 10.43/0.64  2/14/18 CA 27-29 was 16.9  CEA, CA 15-3 is outstanding as of OV 2/26/18 2/20/18 WBC 0.91, ANC 0.38, H/H 11.4/36.5, , CMP okay with glucose noted at 132, total protein low at 5.8, albumin 3.2, Mg 2.0  3/12/18 WBC 11.22, h/h 10.7/32.9, ANC 8370, , CMP okay, total protein at 6.0  5/30/18 WBC 6.21, H/H 10.2/32.7, MCV 95.3, , CMP okay, total protein at 5.7, Albumin 3.2, Alk Phos 144, AST 30, ALT 94 ( on 4/16/18 AST was 40, and ALT was 215)  5/14/18 WBC  10.19, H/H 10.3/32.9, MCV 96.6, , CMP okay total protein at 5.9, Albumin 3.3, Alk phos 115, AST 24, ALT 36  8/30/18 CBC and comp WNL  Labs from 9/26/18 reviewed and are good  2/7/19: WBC 4.86  H&H 13.0/31.2; CMP unremarkable    5/6/19: CBC and CMP WNL; Vit D 28  8/2/19 CBC CMP and Vit D WNL  11/18/19 CBC and CMP unremarkable  2/23/20 Cr 0.67  WBC 6.56 Hg 13.1   9/7/20 CBC and CMP unremarkable  3/11/21 CBC and CMP WNL  9/20/21 CBC and CMP WNL  3/21/22 CBC and CMP WNL  7/3/25 BMP WNL, CA 9.66     Imaging    4/4/18 MRI: axillary adenopathy decreased from 2.1 to 1.2 cm; breast mass decreased from 4.1 to 3.5 cm  4/9/18 CT chest: resolved tiny LLL nodule; stable RML density (patient attributes to previous PE)  10/8/18: DEXA- osteoporosis in and right femoral head; osteopenia left  femoral head  10/16/18 CT c/a/p- stable 2mm LLL nodule, post mastectomy fluid collection RCW  11/15/18: MMG left best- benign  1/30/19 CT Chest parenchymal opacity to right apex and peripheral right middle lobe, these areas do not have masslike appearance and are attributed to post radiation changes; left lower lobe nodule remains stable; right sided post operative seroma has increased in size  8/2/19 CT chest: decreased seroma, LLL nodule stable, right apical opacity stable  1/8/20 MMG screening left: no evidence of malignancy  9/30/20 US right breast fluid collection 8,5x2,6x0,4cm suggestive of seroma or lymphocele. Complex mass including prominent complex cyst with irregular wall thickness anterior aspect of residual right breast tissue  10/14/20 DEXA: T -.2.6 L spine, -2.7 left femur, -3.2 right femur  1/11/21 MMG screening: BIRADS2 benign  12/21/21MRI brain w/ and w/o contrast: Evolving right occipital hemorrhage has decreased in size with less edema. Consider follow-up brain MRI with and without contrast in 3-6 months to document expected evolutionary changes/resolution of enhancement, unless clinically warranted  sooner  1/18/22 MMG screening: BIRADS2    2/17/2023 DEXA: T -1.3 L spine, -2.5  left femur, -2.9 right femur     2/8/2023 MMG screening left breast No mammographic findings suspicious for malignancy     2/27/2023:  Nuclear Whole-Body Bone Scan 2/27/2023:  Scattered degenerative/arthritic activity of the axial and appendicular skeleton.  No evidence of skeletal metastatic disease.    07/24/2024 left breast mammogram BI-RADS 2    2/20/2025 DEXA: (T -1.3 L spine)(-2.50  left femur)(-2.9 right femur)    Review of Systems  CONSTITUTIONAL: no fevers, no chills, no weight loss, no fatigue, no weakness  HEMATOLOGIC: no abnormal bleeding, no abnormal bruising, no drenching night sweats  ONCOLOGIC: no new masses or lumps  HEENT: no vision loss, no tinnitus or hearing loss, no nose bleeding, no dysphagia, no  odynophagia  CVS: no chest pain, no palpitations, no dyspnea on exertion  RESP: no shortness of breath, no hemoptysis, no cough  BREAST: no nipple discharge, no breast tenderness, no breast masses on self breast examination, s/p right mastectomy  GI: no nausea, no vomiting, no diarrhea, no constipation, no melena, no hematochezia, no hematemesis, no abdominal pain, no increase in abdominal girth  : no dysuria, no hematuria, no discharge  GYN: no abnormal vaginal bleeding, no dyspareunia, no vaginal discharge  INTEGUMENT: no rashes, no abnormal bruising, no nail pitting, no hyperpigmentation  NEURO: no falls, no memory loss, + neuropathy LLE (chronic), no urofecal incontinence or retention, no loss of strength on any extremity  MSK: no back pain, no joint pain, no joint swelling  PSYCH: no suicidal or homicidal ideation, no depression, no insomnia, no anhedonia  ENDOCRINE: no heat or cold intolerance, no polyuria, no polydipsia      Physical Exam    There were no vitals filed for this visit.        GA: AAOx3, NAD  HEENT: NCAT, PERRLA, EOMI, no oral ulcers  LYMPH: no cervical, axillary, or supraclavicular adenopathy  CVS: s1s2 RRR, no M/R/G  RESP: CTA b/l, no crackles, no wheezes or rhonchi  Breast:  Deferred  ABD: Soft, NT, ND, BS+, no hepatosplenomegaly  EXT: POPE no deformities, no pedal edema but asymmetry with LLE calf diameter > RLE  SKIN: no rashes, no bruises or purpura, warm and dry  NEURO: normal mentation, strength 5/5 on all 4 extremities, no sensory deficits        Assessment/Plan    #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 Completed EBRT 9/19/18. Arimidex initiated 10/20/18 and stopped 9/9/20 due to severe arthralgias  Femara started on 9/9/20 with same symptoms causing limitations in ADLs and quality of life, Femara has been on hold since 3/2021. She  is willing to try exemestane which was initiated in March 2022  Left MMG 1/2022 benign  CT chest on 1/30/19 with stable left lower lung nodule and right sided seroma. Repeat CT Chest 8/2019 essentially stable.      # Chemotherapy associated neuropathy   On pregabalin 75 mg b.i.d.  Patient reports poor control of neuropathy on pregabalin   Patient will be weaned off pregabalin with plans to start Cymbalta on 04/08/2024      # Osteoporosis  DEXA 10/8/18 revealed osteoporosis. Repeat DEXA 10/2020 showed multifocal osteoporosis as well. Next DEXA due 10/2022  She received 2 doses of prolia 11/2018 and 5/2019 then had interruption until 9/2020 at which point Prolia resumed.  c/w q6 months Prolia.   Continue Vitamin D + Calcium for bone health  Next DEXA scan due in February 2025    Plan   Continue exemestane 25 mg q.d.  Continue calcium and vitamin-D supplementation   Continue with Cymbalta to 30 mg  daily for peripheral neuropathy  Plan to follow-up in 6 months, BMP prior to Prolia  Patient was continue exemestane at least up until September 2026  Left breast MMG due now ordered at last visit    Isidra ECHOLS-KAMI

## 2025-07-03 ENCOUNTER — OFFICE VISIT (OUTPATIENT)
Dept: HEMATOLOGY/ONCOLOGY | Facility: CLINIC | Age: 85
End: 2025-07-03
Payer: MEDICARE

## 2025-07-03 VITALS
HEIGHT: 61 IN | TEMPERATURE: 97 F | DIASTOLIC BLOOD PRESSURE: 66 MMHG | HEART RATE: 69 BPM | SYSTOLIC BLOOD PRESSURE: 115 MMHG | WEIGHT: 141.38 LBS | BODY MASS INDEX: 26.69 KG/M2 | RESPIRATION RATE: 18 BRPM | OXYGEN SATURATION: 96 %

## 2025-07-03 DIAGNOSIS — Z17.0 MALIGNANT NEOPLASM OF OVERLAPPING SITES OF RIGHT BREAST IN FEMALE, ESTROGEN RECEPTOR POSITIVE: ICD-10-CM

## 2025-07-03 DIAGNOSIS — C50.811 MALIGNANT NEOPLASM OF OVERLAPPING SITES OF RIGHT BREAST IN FEMALE, ESTROGEN RECEPTOR POSITIVE: ICD-10-CM

## 2025-07-03 DIAGNOSIS — M81.0 OSTEOPOROSIS OF FEMUR WITHOUT PATHOLOGICAL FRACTURE: Primary | ICD-10-CM

## 2025-08-20 LAB — BCS RECOMMENDATION EXT: NORMAL
